# Patient Record
Sex: MALE | Race: WHITE | NOT HISPANIC OR LATINO | URBAN - METROPOLITAN AREA
[De-identification: names, ages, dates, MRNs, and addresses within clinical notes are randomized per-mention and may not be internally consistent; named-entity substitution may affect disease eponyms.]

---

## 2022-09-22 ENCOUNTER — APPOINTMENT (RX ONLY)
Dept: URBAN - METROPOLITAN AREA CLINIC 259 | Facility: CLINIC | Age: 32
Setting detail: DERMATOLOGY
End: 2022-09-22

## 2022-09-22 DIAGNOSIS — A63.0 ANOGENITAL (VENEREAL) WARTS: ICD-10-CM | Status: INADEQUATELY CONTROLLED

## 2022-09-22 PROCEDURE — ? PRESCRIPTION MEDICATION MANAGEMENT

## 2022-09-22 PROCEDURE — ? PRESCRIPTION

## 2022-09-22 PROCEDURE — ? ADDITIONAL NOTES

## 2022-09-22 PROCEDURE — ? COUNSELING

## 2022-09-22 PROCEDURE — 54056 CRYOSURGERY PENIS LESION(S): CPT

## 2022-09-22 PROCEDURE — ? BENIGN DESTRUCTION (GENITALS)

## 2022-09-22 RX ORDER — IMIQUIMOD 12.5 MG/.25G
CREAM TOPICAL
Qty: 5 | Refills: 0 | Status: ERX | COMMUNITY
Start: 2022-09-22

## 2022-09-22 RX ADMIN — IMIQUIMOD: 12.5 CREAM TOPICAL at 00:00

## 2022-09-22 ASSESSMENT — LOCATION SIMPLE DESCRIPTION DERM
LOCATION SIMPLE: PENIS
LOCATION SIMPLE: SCROTUM

## 2022-09-22 ASSESSMENT — LOCATION ZONE DERM
LOCATION ZONE: GENITALIA
LOCATION ZONE: PENIS

## 2022-09-22 ASSESSMENT — LOCATION DETAILED DESCRIPTION DERM
LOCATION DETAILED: RIGHT DORSAL SHAFT OF PENIS
LOCATION DETAILED: VENTRAL PENILE SHAFT
LOCATION DETAILED: LEFT SCROTUM

## 2022-09-22 NOTE — PROCEDURE: PRESCRIPTION MEDICATION MANAGEMENT
Initiate Treatment: Imiquimod apply to genitals every night Monday through Friday
Detail Level: Zone
Render In Strict Bullet Format?: No

## 2022-09-22 NOTE — PROCEDURE: BENIGN DESTRUCTION (GENITALS)
Anesthesia Volume In Cc: 0
Detail Level: Detailed
Consent: The patient's consent was obtained including but not limited to risks of crusting, scabbing, blistering, scarring, darker or lighter pigmentary change, recurrence, incomplete removal and infection.
Render Post-Care Instructions In Note?: no
Post-Care Instructions: I reviewed with the patient in detail post-care instructions. Patient is to wear sunprotection, and avoid picking at any of the treated lesions. Pt may apply Vaseline to crusted or scabbing areas.
Warning: This plan will only bill for destructions on the Penis and Vulva. If you select the Scrotum it will not bill.
Method: liquid nitrogen

## 2022-10-20 ENCOUNTER — APPOINTMENT (RX ONLY)
Dept: URBAN - METROPOLITAN AREA CLINIC 259 | Facility: CLINIC | Age: 32
Setting detail: DERMATOLOGY
End: 2022-10-20

## 2022-10-20 DIAGNOSIS — A63.0 ANOGENITAL (VENEREAL) WARTS: ICD-10-CM | Status: IMPROVED

## 2022-10-20 PROCEDURE — ? BENIGN DESTRUCTION (GENITALS)

## 2022-10-20 PROCEDURE — ? ADDITIONAL NOTES

## 2022-10-20 PROCEDURE — 54056 CRYOSURGERY PENIS LESION(S): CPT

## 2022-10-20 PROCEDURE — ? PRESCRIPTION MEDICATION MANAGEMENT

## 2022-10-20 PROCEDURE — ? COUNSELING

## 2022-10-20 ASSESSMENT — LOCATION ZONE DERM
LOCATION ZONE: PENIS
LOCATION ZONE: GENITALIA

## 2022-10-20 ASSESSMENT — LOCATION DETAILED DESCRIPTION DERM
LOCATION DETAILED: LEFT SCROTUM
LOCATION DETAILED: VENTRAL PENILE SHAFT
LOCATION DETAILED: RIGHT DORSAL SHAFT OF PENIS

## 2022-10-20 ASSESSMENT — LOCATION SIMPLE DESCRIPTION DERM
LOCATION SIMPLE: PENIS
LOCATION SIMPLE: SCROTUM

## 2022-10-20 NOTE — PROCEDURE: PRESCRIPTION MEDICATION MANAGEMENT
Detail Level: Zone
Render In Strict Bullet Format?: No
Continue Regimen: Imiquimod apply to genitals every night Monday through Friday or every other day